# Patient Record
Sex: MALE | Race: WHITE | NOT HISPANIC OR LATINO | Employment: FULL TIME | URBAN - METROPOLITAN AREA
[De-identification: names, ages, dates, MRNs, and addresses within clinical notes are randomized per-mention and may not be internally consistent; named-entity substitution may affect disease eponyms.]

---

## 2024-06-21 ENCOUNTER — OFFICE VISIT (OUTPATIENT)
Dept: OPHTHALMOLOGY | Facility: CLINIC | Age: 49
End: 2024-06-21
Payer: COMMERCIAL

## 2024-06-21 DIAGNOSIS — H18.603 KERATOCONUS OF BOTH EYES: Primary | ICD-10-CM

## 2024-06-21 DIAGNOSIS — H18.613 STABLE KERATOCONUS OF BOTH EYES: ICD-10-CM

## 2024-06-21 LAB
KMAX (OD): 47.4 DIOPTERS
KMAX (OS): 51.4 DIOPTERS
PACH THINNEST (OD): 445 MICRONS
PACH THINNEST (OS): 472 MICRONS
SIMK FLAT (OD): 45 DIOPTERS
SIMK FLAT (OS): 4.5 DIOPTERS
SIMK STEEP (OD): 45.9 DIOPTERS
SIMK STEEP (OS): 45.4 DIOPTERS

## 2024-06-21 PROCEDURE — 92025 CPTRIZED CORNEAL TOPOGRAPHY: CPT | Performed by: OPTOMETRIST

## 2024-06-21 PROCEDURE — 92310 CONTACT LENS FITTING OU: CPT | Performed by: OPTOMETRIST

## 2024-06-21 PROCEDURE — V2521 CNTCT LENS HYDROPHILIC TORIC: HCPCS | Performed by: OPTOMETRIST

## 2024-06-21 PROCEDURE — V2520 CONTACT LENS HYDROPHILIC: HCPCS | Performed by: OPTOMETRIST

## 2024-06-21 PROCEDURE — 92014 COMPRE OPH EXAM EST PT 1/>: CPT | Performed by: OPTOMETRIST

## 2024-06-21 RX ORDER — AMLODIPINE BESYLATE 5 MG/1
5 TABLET ORAL
COMMUNITY
Start: 2023-09-19 | End: 2024-09-18

## 2024-06-21 RX ORDER — SEMAGLUTIDE 1.34 MG/ML
2 INJECTION, SOLUTION SUBCUTANEOUS
COMMUNITY
Start: 2024-04-25 | End: 2024-10-22

## 2024-06-21 ASSESSMENT — VISUAL ACUITY
OD_CC: 20/20
OS_CC: 20/20
CORRECTION_TYPE: CONTACTS
METHOD: SNELLEN - LINEAR

## 2024-06-21 ASSESSMENT — REFRACTION_CURRENTRX
OS_AXIS: 080
OD_DIAMETER: 10.8
OS_AXIS: 080
OD_SPHERE: -0.75
OD_CYLINDER: SPHERE
OS_CYLINDER: -2.25
OS_SPHERE: -3.00
OS_BRAND: AIR OPTIX FOR ASTIGMATISM
OS_SPHERE: -3.00
OS_DIAMETER: 14.5
OS_BASECURVE: 8.7
OD_BASECURVE: 8.4
OD_SPHERE: -5.00
OS_DIAMETER: 14.5
OD_DIAMETER: 14.0
OD_BASECURVE: 7.85
OS_BRAND: AIR OPTIX HYDRAGLYDE FOR ASTIGMATISM
OD_BRAND: ACUVUE OASYS
OS_BASECURVE: 8.7
OS_CYLINDER: -2.25

## 2024-06-21 ASSESSMENT — ENCOUNTER SYMPTOMS
HEMATOLOGIC/LYMPHATIC NEGATIVE: 0
PSYCHIATRIC NEGATIVE: 0
CARDIOVASCULAR NEGATIVE: 0
EYES NEGATIVE: 0
RESPIRATORY NEGATIVE: 0
ALLERGIC/IMMUNOLOGIC NEGATIVE: 0
ENDOCRINE NEGATIVE: 0
MUSCULOSKELETAL NEGATIVE: 0
GASTROINTESTINAL NEGATIVE: 0
NEUROLOGICAL NEGATIVE: 0
CONSTITUTIONAL NEGATIVE: 0

## 2024-06-21 ASSESSMENT — SLIT LAMP EXAM - LIDS
COMMENTS: GOOD POSITION
COMMENTS: GOOD POSITION

## 2024-06-21 ASSESSMENT — CONF VISUAL FIELD
OD_INFERIOR_TEMPORAL_RESTRICTION: 0
OD_SUPERIOR_NASAL_RESTRICTION: 0
OD_NORMAL: 1
OS_INFERIOR_TEMPORAL_RESTRICTION: 0
OD_SUPERIOR_TEMPORAL_RESTRICTION: 0
OS_SUPERIOR_NASAL_RESTRICTION: 0
OS_SUPERIOR_TEMPORAL_RESTRICTION: 0
OD_INFERIOR_NASAL_RESTRICTION: 0
OS_INFERIOR_NASAL_RESTRICTION: 0
OS_NORMAL: 1
METHOD: COUNTING FINGERS

## 2024-06-21 ASSESSMENT — CUP TO DISC RATIO
OS_RATIO: 0.20
OD_RATIO: 0.20

## 2024-06-21 ASSESSMENT — EXTERNAL EXAM - RIGHT EYE: OD_EXAM: NORMAL

## 2024-06-21 ASSESSMENT — TONOMETRY
OS_IOP_MMHG: 15
IOP_METHOD: GOLDMANN APPLANATION
OD_IOP_MMHG: 15

## 2024-06-21 ASSESSMENT — EXTERNAL EXAM - LEFT EYE: OS_EXAM: NORMAL

## 2024-06-21 NOTE — Clinical Note
Both eyes (OU) Contact Lens Order  Quantity: 1 Package: 24 PK Appointment needed? No Medically necessary? Yes Ship To: Home Additional instructions: 24 pack Right eye and 2  6 packs OS

## 2024-06-21 NOTE — PROGRESS NOTES
Assessment/Plan   Diagnoses and all orders for this visit:  Keratoconus of both eyes  -     Corneal Topography - OU - Both Eyes  Stable keratoconus of both eyes since CXL Left eye    No rigid gas permeable (RGP) change Right eye  Reorder soft contact lens (SCL) Both eyes Mail to patient

## 2024-06-25 ENCOUNTER — APPOINTMENT (OUTPATIENT)
Dept: OPHTHALMOLOGY | Facility: CLINIC | Age: 49
End: 2024-06-25
Payer: COMMERCIAL

## 2025-06-27 ENCOUNTER — APPOINTMENT (OUTPATIENT)
Dept: OPHTHALMOLOGY | Facility: CLINIC | Age: 50
End: 2025-06-27
Payer: COMMERCIAL

## 2025-06-27 DIAGNOSIS — H18.603 KERATOCONUS OF BOTH EYES: Primary | ICD-10-CM

## 2025-06-27 DIAGNOSIS — H18.613 STABLE KERATOCONUS OF BOTH EYES: ICD-10-CM

## 2025-06-27 LAB
CENTRAL CORNEA THICKNESS (OD): 472 MICRONS
CENTRAL CORNEA THICKNESS (OS): 502 MICRONS
KMAX (OD): 47.5 DIOPTERS
KMAX (OS): 50.6 DIOPTERS
PACH THINNEST (OD): 439 MICRONS
PACH THINNEST (OS): 472 MICRONS
SIMK FLAT (OD): 44.9 DIOPTERS
SIMK FLAT (OS): 43.5 DIOPTERS
SIMK STEEP (OD): 45.3 DIOPTERS
SIMK STEEP (OS): 45.2 DIOPTERS
SIMK STEEP AXIS (OD): 158.8 DEGREES
SIMK STEEP AXIS (OS): 58.9 DEGREES

## 2025-06-27 ASSESSMENT — REFRACTION_CURRENTRX
OD_SPHERE: -0.75
OS_BASECURVE: 8.7
OD_BRAND: ACUVUE OASYS
OD_SPHERE: -5.00
OD_SPHERE: -4.50
OS_CYLINDER: -2.25
OS_BASECURVE: 8.7
OD_CYLINDER: SPHERE
OS_SPHERE: -3.00
OS_BRAND: AIR OPTIX HYDRAGLYDE FOR ASTIGMATISM
OD_BASECURVE: 8.4
OD_CYLINDER: SPHERE
OS_DIAMETER: 14.5
OS_CYLINDER: -2.25
OD_DIAMETER: 10.8
OS_DIAMETER: 14.5
OS_SPHERE: -3.00
OD_BASECURVE: 7.85
OS_AXIS: 080
OS_AXIS: 080
OD_DIAMETER: 10.8
OD_DIAMETER: 14.0
OS_BRAND: AIR OPTIX FOR ASTIGMATISM
OD_BASECURVE: 7.85

## 2025-06-27 ASSESSMENT — CONF VISUAL FIELD
METHOD: COUNTING FINGERS
OD_INFERIOR_TEMPORAL_RESTRICTION: 0
OS_NORMAL: 1
OS_INFERIOR_NASAL_RESTRICTION: 0
OD_SUPERIOR_TEMPORAL_RESTRICTION: 0
OS_SUPERIOR_TEMPORAL_RESTRICTION: 0
OD_NORMAL: 1
OD_INFERIOR_NASAL_RESTRICTION: 0
OS_SUPERIOR_NASAL_RESTRICTION: 0
OD_SUPERIOR_NASAL_RESTRICTION: 0
OS_INFERIOR_TEMPORAL_RESTRICTION: 0

## 2025-06-27 ASSESSMENT — VISUAL ACUITY
OD_CC: 20/20
VA_OR_OS_CURRENT_RX: 20/20
VA_OR_OD_CURRENT_RX: 20/20
VA_OR_OS_CURRENT_RX: 20/20
OD_CC: 20/30+2
VA_OR_OS_CURRENT_RX: 20/20
OS_CC: 20/25
CORRECTION_TYPE: CONTACTS
VA_OR_OD_CURRENT_RX: 20/20
OS_CC: 20/20
VA_OR_OD_CURRENT_RX: 20/20
METHOD: SNELLEN - LINEAR

## 2025-06-27 ASSESSMENT — EXTERNAL EXAM - LEFT EYE: OS_EXAM: NORMAL

## 2025-06-27 ASSESSMENT — TONOMETRY
IOP_METHOD: GOLDMANN APPLANATION
OD_IOP_MMHG: 12
OS_IOP_MMHG: 12

## 2025-06-27 ASSESSMENT — CUP TO DISC RATIO
OS_RATIO: 0.20
OD_RATIO: 0.20

## 2025-06-27 ASSESSMENT — EXTERNAL EXAM - RIGHT EYE: OD_EXAM: NORMAL

## 2025-06-27 ASSESSMENT — SLIT LAMP EXAM - LIDS
COMMENTS: GOOD POSITION
COMMENTS: GOOD POSITION

## 2025-06-27 NOTE — PROGRESS NOTES
Assessment/Plan   Diagnoses and all orders for this visit:  Keratoconus of both eyes  -     Corneal Topography - OU - Both Eyes  Stable keratoconus of both eyes  Status post (s/p) CXL Left eye     Order new rigid gas permeable (RGP) +0.25D OD, mail to pt  Lens flexing and causing it to be tighter but no change in topography from previous visit.   Deferred dilation today, plan to dilate next year    Call to order soft contact lens (SCL) as needed.

## 2025-06-27 NOTE — Clinical Note
Right eye (OD) Contact Lens Order  Quantity: 1 Package: RGP Appointment needed? No Medically necessary? Yes Ship To: Home Additional instructions: same rigid gas permeable (RGP) as last except new power

## 2026-07-10 ENCOUNTER — APPOINTMENT (OUTPATIENT)
Dept: OPHTHALMOLOGY | Facility: CLINIC | Age: 51
End: 2026-07-10
Payer: COMMERCIAL